# Patient Record
Sex: MALE | URBAN - METROPOLITAN AREA
[De-identification: names, ages, dates, MRNs, and addresses within clinical notes are randomized per-mention and may not be internally consistent; named-entity substitution may affect disease eponyms.]

---

## 2018-06-01 ENCOUNTER — OUTPATIENT (OUTPATIENT)
Dept: OUTPATIENT SERVICES | Facility: HOSPITAL | Age: 63
LOS: 1 days | End: 2018-06-01
Payer: MEDICAID

## 2018-06-01 ENCOUNTER — EMERGENCY (EMERGENCY)
Facility: HOSPITAL | Age: 63
LOS: 1 days | Discharge: HOME | End: 2018-06-01
Attending: EMERGENCY MEDICINE | Admitting: EMERGENCY MEDICINE
Payer: COMMERCIAL

## 2018-06-01 VITALS
DIASTOLIC BLOOD PRESSURE: 87 MMHG | TEMPERATURE: 98 F | HEART RATE: 93 BPM | OXYGEN SATURATION: 89 % | RESPIRATION RATE: 17 BRPM | SYSTOLIC BLOOD PRESSURE: 143 MMHG

## 2018-06-01 VITALS
TEMPERATURE: 98 F | DIASTOLIC BLOOD PRESSURE: 71 MMHG | OXYGEN SATURATION: 92 % | SYSTOLIC BLOOD PRESSURE: 136 MMHG | RESPIRATION RATE: 18 BRPM | HEART RATE: 79 BPM

## 2018-06-01 DIAGNOSIS — R05 COUGH: ICD-10-CM

## 2018-06-01 DIAGNOSIS — Z88.5 ALLERGY STATUS TO NARCOTIC AGENT: ICD-10-CM

## 2018-06-01 DIAGNOSIS — J44.9 CHRONIC OBSTRUCTIVE PULMONARY DISEASE, UNSPECIFIED: ICD-10-CM

## 2018-06-01 DIAGNOSIS — F17.200 NICOTINE DEPENDENCE, UNSPECIFIED, UNCOMPLICATED: ICD-10-CM

## 2018-06-01 LAB
ALBUMIN SERPL ELPH-MCNC: 3.4 G/DL — SIGNIFICANT CHANGE UP (ref 3.3–5)
ALP SERPL-CCNC: 144 U/L — HIGH (ref 40–120)
ALT FLD-CCNC: 65 U/L — HIGH (ref 10–45)
ANION GAP SERPL CALC-SCNC: 12 MMOL/L — SIGNIFICANT CHANGE UP (ref 5–17)
APTT BLD: 27.1 SEC — LOW (ref 27.5–37.4)
AST SERPL-CCNC: 54 U/L — HIGH (ref 10–40)
BASE EXCESS BLDV CALC-SCNC: 3.9 MMOL/L — SIGNIFICANT CHANGE UP
BASOPHILS NFR BLD AUTO: 0.4 % — SIGNIFICANT CHANGE UP (ref 0–2)
BILIRUB SERPL-MCNC: 0.4 MG/DL — SIGNIFICANT CHANGE UP (ref 0.2–1.2)
BUN SERPL-MCNC: 8 MG/DL — SIGNIFICANT CHANGE UP (ref 7–23)
CA-I SERPL-SCNC: 1.15 MMOL/L — SIGNIFICANT CHANGE UP (ref 1.12–1.3)
CALCIUM SERPL-MCNC: 9 MG/DL — SIGNIFICANT CHANGE UP (ref 8.4–10.5)
CHLORIDE SERPL-SCNC: 94 MMOL/L — LOW (ref 96–108)
CO2 SERPL-SCNC: 29 MMOL/L — SIGNIFICANT CHANGE UP (ref 22–31)
CREAT SERPL-MCNC: 0.74 MG/DL — SIGNIFICANT CHANGE UP (ref 0.5–1.3)
EOSINOPHIL NFR BLD AUTO: 1.7 % — SIGNIFICANT CHANGE UP (ref 0–6)
EXTRA SST TUBE: SIGNIFICANT CHANGE UP
GAS PNL BLDV: 137 MMOL/L — LOW (ref 138–146)
GAS PNL BLDV: SIGNIFICANT CHANGE UP
GAS PNL BLDV: SIGNIFICANT CHANGE UP
GLUCOSE SERPL-MCNC: 98 MG/DL — SIGNIFICANT CHANGE UP (ref 70–99)
HCO3 BLDV-SCNC: 30 MMOL/L — HIGH (ref 20–27)
HCT VFR BLD CALC: 35.6 % — LOW (ref 39–50)
HGB BLD-MCNC: 11.8 G/DL — LOW (ref 13–17)
INR BLD: 1.2 — HIGH (ref 0.88–1.16)
LACTATE SERPL-SCNC: 0.5 MMOL/L — SIGNIFICANT CHANGE UP (ref 0.5–2)
LYMPHOCYTES # BLD AUTO: 31.4 % — SIGNIFICANT CHANGE UP (ref 13–44)
MCHC RBC-ENTMCNC: 30.3 PG — SIGNIFICANT CHANGE UP (ref 27–34)
MCHC RBC-ENTMCNC: 33.1 G/DL — SIGNIFICANT CHANGE UP (ref 32–36)
MCV RBC AUTO: 91.5 FL — SIGNIFICANT CHANGE UP (ref 80–100)
MONOCYTES NFR BLD AUTO: 8.8 % — SIGNIFICANT CHANGE UP (ref 2–14)
NEUTROPHILS NFR BLD AUTO: 57.7 % — SIGNIFICANT CHANGE UP (ref 43–77)
PCO2 BLDV: 49 MMHG — SIGNIFICANT CHANGE UP (ref 41–51)
PH BLDV: 7.4 — SIGNIFICANT CHANGE UP (ref 7.32–7.43)
PLATELET # BLD AUTO: 269 K/UL — SIGNIFICANT CHANGE UP (ref 150–400)
PO2 BLDV: 38 MMHG — SIGNIFICANT CHANGE UP
POTASSIUM BLDV-SCNC: 4.1 MMOL/L — SIGNIFICANT CHANGE UP (ref 3.5–4.9)
POTASSIUM SERPL-MCNC: 4.2 MMOL/L — SIGNIFICANT CHANGE UP (ref 3.5–5.3)
POTASSIUM SERPL-SCNC: 4.2 MMOL/L — SIGNIFICANT CHANGE UP (ref 3.5–5.3)
PROT SERPL-MCNC: 7.8 G/DL — SIGNIFICANT CHANGE UP (ref 6–8.3)
PROTHROM AB SERPL-ACNC: 13.4 SEC — HIGH (ref 9.8–12.7)
RBC # BLD: 3.89 M/UL — LOW (ref 4.2–5.8)
RBC # FLD: 13.5 % — SIGNIFICANT CHANGE UP (ref 10.3–16.9)
SAO2 % BLDV: 76 % — SIGNIFICANT CHANGE UP
SODIUM SERPL-SCNC: 135 MMOL/L — SIGNIFICANT CHANGE UP (ref 135–145)
WBC # BLD: 9.3 K/UL — SIGNIFICANT CHANGE UP (ref 3.8–10.5)
WBC # FLD AUTO: 9.3 K/UL — SIGNIFICANT CHANGE UP (ref 3.8–10.5)

## 2018-06-01 PROCEDURE — 71046 X-RAY EXAM CHEST 2 VIEWS: CPT | Mod: 26

## 2018-06-01 PROCEDURE — 71046 X-RAY EXAM CHEST 2 VIEWS: CPT

## 2018-06-01 PROCEDURE — 93010 ELECTROCARDIOGRAM REPORT: CPT

## 2018-06-01 PROCEDURE — 87040 BLOOD CULTURE FOR BACTERIA: CPT

## 2018-06-01 PROCEDURE — 82330 ASSAY OF CALCIUM: CPT

## 2018-06-01 PROCEDURE — 99285 EMERGENCY DEPT VISIT HI MDM: CPT | Mod: 25

## 2018-06-01 PROCEDURE — 83605 ASSAY OF LACTIC ACID: CPT

## 2018-06-01 PROCEDURE — 84132 ASSAY OF SERUM POTASSIUM: CPT

## 2018-06-01 PROCEDURE — 99284 EMERGENCY DEPT VISIT MOD MDM: CPT | Mod: 25

## 2018-06-01 PROCEDURE — 82803 BLOOD GASES ANY COMBINATION: CPT

## 2018-06-01 PROCEDURE — 93005 ELECTROCARDIOGRAM TRACING: CPT

## 2018-06-01 PROCEDURE — 94640 AIRWAY INHALATION TREATMENT: CPT

## 2018-06-01 PROCEDURE — 85025 COMPLETE CBC W/AUTO DIFF WBC: CPT

## 2018-06-01 PROCEDURE — 85730 THROMBOPLASTIN TIME PARTIAL: CPT

## 2018-06-01 PROCEDURE — 80053 COMPREHEN METABOLIC PANEL: CPT

## 2018-06-01 PROCEDURE — 84295 ASSAY OF SERUM SODIUM: CPT

## 2018-06-01 PROCEDURE — 85610 PROTHROMBIN TIME: CPT

## 2018-06-01 PROCEDURE — G9001: CPT

## 2018-06-01 RX ORDER — ALBUTEROL 90 UG/1
2 AEROSOL, METERED ORAL
Qty: 1 | Refills: 0 | OUTPATIENT
Start: 2018-06-01 | End: 2018-06-30

## 2018-06-01 RX ORDER — IPRATROPIUM/ALBUTEROL SULFATE 18-103MCG
3 AEROSOL WITH ADAPTER (GRAM) INHALATION ONCE
Qty: 0 | Refills: 0 | Status: COMPLETED | OUTPATIENT
Start: 2018-06-01 | End: 2018-06-01

## 2018-06-01 RX ADMIN — Medication 3 MILLILITER(S): at 16:56

## 2018-06-01 RX ADMIN — Medication 3 MILLILITER(S): at 16:40

## 2018-06-01 RX ADMIN — Medication 50 MILLIGRAM(S): at 16:40

## 2018-06-01 NOTE — ED PROVIDER NOTE - MEDICAL DECISION MAKING DETAILS
Pt with known hx of COPD with heavy smoking presents with shortness of breath over one month.  seen by doctor today and had routine blood testing done but was told to come to ED if still symptomatic, - pt somnolent secondary to methadone use.  lungs clear - O2 stat initially 80s improved with nebs, steroids, and with pt becoming more awake, labs ok, cxr with ? infiltrate, will treat with steroids, nebs and antibiotics at discharge secondary to smoking use.  Pt received pulm follow up.

## 2018-06-01 NOTE — ED PROVIDER NOTE - ATTESTATION, MLM
I have reviewed and confirmed nurses' notes for patient's medications, allergies, medical history, and surgical history.
Verbalized Understanding/Patient asked questions/Simple: Patient demonstrates quick and easy understanding

## 2018-06-01 NOTE — ED PROVIDER NOTE - OBJECTIVE STATEMENT
64 y/o m with PMH of COPD not on home O2, BPH, on methadone s/p IVDA for 30 years presents to ED with dypsnea on exertion, cough x 1 month.  Pt seen by doctor from program today and referred to ED secondary to low O2 levels.  Pt denies fever.  States cough has improved.  No pleuritic chest pain.  No leg pain or leg swelling.  PT smokes daily for many years.

## 2018-06-01 NOTE — ED ADULT NURSE NOTE - OBJECTIVE STATEMENT
Patient presents to ED with c/o cough and SOB x3 weeks. Patient reports "It feels like somebody punched me in my ribs." Patient speaking in full sentences. Hx of COPD. Patient reports "I quit smoking 1 week ago." Reports fevers at home. Placed on cardiac monitor and 2L O2 via nasal cannula. Safety precautions maintained. Will continue to monitor.

## 2018-06-06 DIAGNOSIS — R69 ILLNESS, UNSPECIFIED: ICD-10-CM

## 2018-06-06 LAB
CULTURE RESULTS: SIGNIFICANT CHANGE UP
CULTURE RESULTS: SIGNIFICANT CHANGE UP
SPECIMEN SOURCE: SIGNIFICANT CHANGE UP
SPECIMEN SOURCE: SIGNIFICANT CHANGE UP

## 2018-07-01 ENCOUNTER — OUTPATIENT (OUTPATIENT)
Dept: OUTPATIENT SERVICES | Facility: HOSPITAL | Age: 63
LOS: 1 days | End: 2018-07-01

## 2018-07-22 DIAGNOSIS — Z71.89 OTHER SPECIFIED COUNSELING: ICD-10-CM

## 2021-07-21 NOTE — ED ADULT TRIAGE NOTE - NS ED NURSE AMBULANCES
carl    Patient is calling on the status of [facility] paper work that was dropped off at clinic last week states his employer has not yet received    [facility]

## 2024-09-17 ENCOUNTER — APPOINTMENT (OUTPATIENT)
Age: 69
End: 2024-09-17
Payer: MEDICARE

## 2024-09-17 ENCOUNTER — APPOINTMENT (OUTPATIENT)
Dept: UROLOGY | Facility: CLINIC | Age: 69
End: 2024-09-17

## 2024-09-17 VITALS
SYSTOLIC BLOOD PRESSURE: 117 MMHG | WEIGHT: 152 LBS | TEMPERATURE: 96.1 F | HEIGHT: 65.55 IN | RESPIRATION RATE: 14 BRPM | HEART RATE: 84 BPM | OXYGEN SATURATION: 91 % | BODY MASS INDEX: 25.02 KG/M2 | DIASTOLIC BLOOD PRESSURE: 78 MMHG

## 2024-09-17 DIAGNOSIS — Z12.5 ENCOUNTER FOR SCREENING FOR MALIGNANT NEOPLASM OF PROSTATE: ICD-10-CM

## 2024-09-17 PROCEDURE — 99203 OFFICE O/P NEW LOW 30 MIN: CPT

## 2024-09-19 LAB
APPEARANCE: CLEAR
BACTERIA: NEGATIVE /HPF
BILIRUBIN URINE: NEGATIVE
BLOOD URINE: NEGATIVE
CAST: 0 /LPF
COLOR: YELLOW
EPITHELIAL CELLS: 0 /HPF
GLUCOSE QUALITATIVE U: NEGATIVE MG/DL
KETONES URINE: NEGATIVE MG/DL
LEUKOCYTE ESTERASE URINE: NEGATIVE
MICROSCOPIC-UA: NORMAL
NITRITE URINE: NEGATIVE
PH URINE: 6
PROTEIN URINE: NEGATIVE MG/DL
PSA FREE FLD-MCNC: 53 %
PSA FREE SERPL-MCNC: 0.54 NG/ML
PSA SERPL-MCNC: 1.01 NG/ML
RED BLOOD CELLS URINE: 0 /HPF
SPECIFIC GRAVITY URINE: 1.01
UROBILINOGEN URINE: 0.2 MG/DL
WHITE BLOOD CELLS URINE: 0 /HPF

## 2024-09-20 LAB — BACTERIA UR CULT: NORMAL

## 2024-09-22 NOTE — HISTORY OF PRESENT ILLNESS
[FreeTextEntry1] :   CHEMO OCHOA Jan 20 1955   Language: English Date of First visit: 09/17/2024 Accompanied by: self  Contact info: Referring Provider/PCP: Dr. Dr. Juan C Moon  [phone] Fax:    CC/ Problem List:  LUTS =============================================================================== FIRST VISIT / Summary: Very pleasant 69 year old M here for frequent urination x 2 months. Unable to leave apartment for more than 1 hour due to constant need to void. Up every 75 minutes at night to void. Has pain to right lower back and right lower abdominal pain. Enlarged prostate for 12-15 years. Verbalizes 2 months ago sat on wooded chair, it broke and he fell on his buttocks.  Reports constipation. Urinary urgency, delayed start, weak stream, intermittency, nocturia and leakage at end of void  ------------------------------------------------------------------------------------------- INTERVAL VISITS:   ===============================================================================   PMH: COPD, enlarged prostate Meds: tamsulosin, annoro, methadone All: denies FHx: paternal uncles with prostate cancer diagnosed in their 60-70s  SocHx: current smoker 1 ppd x 55 yrs, no Etoh, single, no children, retired    PSH: splenectomy   ROS: Review of Systems is as per HPI unless otherwise denoted below   =============================================================================== DATA: LABS (SELECTED):---------------------------------------------------------------------------------------------------     RADS:-------------------------------------------------------------------------------------------------------------------     PATHOLOGY/CYTOLOGY:-------------------------------------------------------------------------------------------     VOIDING STUDIES: ----------------------------------------------------------------------------------------------------     STONE STUDIES: (Analysis/LLSA)----------------------------------------------------------------------------------     PROCEDURES: -----------------------------------------------------------------------------------------------       =============================================================================== PHYSICAL EXAM:    FOCUSED: ----------------------------------------------------------------------------------------------------------------     ======================================================================================= DISCUSSION: ======================================================================================= ASSESSMENT and PLAN   1. LUTS -UA/UCx -PSA -discussed medication/surgery options -offered refills for medications -RTC in 6-12 months   =======================================================================================  3 Thank you for allowing me to assist in the care of your patient. Should you have any questions please do not hesitate to reach out to me.     uJan C Coy MD                                                     [facility]   [place] Office: [address], [address] [place] [address] T: [phone] F: [phone]   [place] Office: [address], [address] [place] [address] T: [phone] F: [phone]

## 2024-09-22 NOTE — HISTORY OF PRESENT ILLNESS
[FreeTextEntry1] :   CHEMO OCHOA Jan 20 1955   Language: English Date of First visit: 09/17/2024 Accompanied by: self  Contact info: Referring Provider/PCP: Dr. Dr. Juan C Moon  [phone] Fax:    CC/ Problem List:  LUTS =============================================================================== FIRST VISIT / Summary: Very pleasant 69 year old M here for frequent urination x 2 months. Unable to leave apartment for more than 1 hour due to constant need to void. Up every 75 minutes at night to void. Has pain to right lower back and right lower abdominal pain. Enlarged prostate for 12-15 years. Verbalizes 2 months ago sat on wooded chair, it broke and he fell on his buttocks.  Reports constipation. Urinary urgency, delayed start, weak stream, intermittency, nocturia and leakage at end of void  ------------------------------------------------------------------------------------------- INTERVAL VISITS:   ===============================================================================   PMH: COPD, enlarged prostate Meds: tamsulosin, annoro, methadone All: denies FHx: paternal uncles with prostate cancer diagnosed in their 60-70s  SocHx: current smoker 1 ppd x 55 yrs, no Etoh, single, no children, retired    PSH: splenectomy   ROS: Review of Systems is as per HPI unless otherwise denoted below   =============================================================================== DATA: LABS (SELECTED):---------------------------------------------------------------------------------------------------     RADS:-------------------------------------------------------------------------------------------------------------------     PATHOLOGY/CYTOLOGY:-------------------------------------------------------------------------------------------     VOIDING STUDIES: ----------------------------------------------------------------------------------------------------     STONE STUDIES: (Analysis/LLSA)----------------------------------------------------------------------------------     PROCEDURES: -----------------------------------------------------------------------------------------------       =============================================================================== PHYSICAL EXAM:    FOCUSED: ----------------------------------------------------------------------------------------------------------------     ======================================================================================= DISCUSSION: ======================================================================================= ASSESSMENT and PLAN   1. LUTS -UA/UCx -PSA -discussed medication/surgery options -offered refills for medications -RTC in 6-12 months   =======================================================================================  3 Thank you for allowing me to assist in the care of your patient. Should you have any questions please do not hesitate to reach out to me.     Juan C Coy MD                                                     [facility]   [place] Office: [address], [address] [place] [address] T: [phone] F: [phone]   [place] Office: [address], [address] [place] [address] T: [phone] F: [phone]

## 2024-09-24 ENCOUNTER — APPOINTMENT (OUTPATIENT)
Age: 69
End: 2024-09-24
Payer: MEDICARE

## 2024-09-24 VITALS
TEMPERATURE: 97 F | SYSTOLIC BLOOD PRESSURE: 129 MMHG | BODY MASS INDEX: 25.02 KG/M2 | HEART RATE: 91 BPM | HEIGHT: 65.55 IN | DIASTOLIC BLOOD PRESSURE: 80 MMHG | RESPIRATION RATE: 14 BRPM | OXYGEN SATURATION: 92 % | WEIGHT: 152 LBS

## 2024-09-24 PROCEDURE — 99214 OFFICE O/P EST MOD 30 MIN: CPT

## 2024-09-24 PROCEDURE — G2211 COMPLEX E/M VISIT ADD ON: CPT

## 2024-09-24 NOTE — HISTORY OF PRESENT ILLNESS
[FreeTextEntry1] : CHEMO OCHOA Jan 20 1955  Language: English Date of First visit: 09/17/2024 Accompanied by: self Contact info: Referring Provider/PCP: Glenn undecided Fax: tw  CC/ Problem List: LUTS =============================================================================== FIRST VISIT / Summary: Very pleasant 69 year old M here for frequent urination x 2 months. Unable to leave apartment for more than 1 hour due to constant need to void. Up every 75 minutes at night to void. Has pain to right lower back and right lower abdominal pain. Enlarged prostate for 12-15 years. Verbalizes 2 months ago sat on wooded chair, it broke and he fell on his buttocks. Reports constipation. Urinary urgency, delayed start, weak stream, intermittency, nocturia and leakage at end of void ------------------------------------------------------------------------------------------- INTERVAL VISITS: The patient's medications and allergies were reviewed and edited below. Dated 09/24/2024   Here for back pain "daggers" in lower back. Had new mattress but not improved. He thinks related to his fall on chair. Has not seen spine specialist. He is looking for a new PCP. He states he is limiting fluids before bedtime. tries for 20 minutes to urinate.   ===============================================================================  PMH: COPD, enlarged prostate Meds: tamsulosin, annoro, methadone All: denies FHx: paternal uncles with prostate cancer diagnosed in their 60-70s SocHx: current smoker 1 ppd x 55 yrs, no Etoh, single, no children, retired  PSH: splenectomy  ROS: Review of Systems is as per HPI unless otherwise denoted below  =============================================================================== DATA: LABS (SELECTED):---------------------------------------------------------------------------------------------------   RADS:-------------------------------------------------------------------------------------------------------------------   PATHOLOGY/CYTOLOGY:-------------------------------------------------------------------------------------------   VOIDING STUDIES: ---------------------------------------------------------------------------------------------------- 09/24/2024 Unable to do uroflow: Random scan 435cc.  STONE STUDIES: (Analysis/LLSA)----------------------------------------------------------------------------------   PROCEDURES: -----------------------------------------------------------------------------------------------    =============================================================================== PHYSICAL EXAM:   FOCUSED: ----------------------------------------------------------------------------------------------------------------   ======================================================================================= DISCUSSION: ======================================================================================= ASSESSMENT and PLAN  1. LUTS - UA/UCx - PSA - discussed medication/surgery options - offered refills for medications  2. Back pain - RBUS, see spine specialist given recent injury - RTC in 1 week after US  ======================================================================================= The total time personally spent preparing for this visit (reviewing test results, obtaining external history) and during the visit (ordering tests/medications, spent face to face with the patient / family and counseling them on the above), as well as after the visit (on clinical documentation and coordination with other care providers) was approximately 35 minutes.   Thank you for allowing me to assist in the care of your patient. Should you have any questions please do not hesitate to reach out to me.   Juan C Coy MD       [facility] Physician [facility]  [place] Office: [address], [address] [place] [address] T: [phone] F: [phone]  [place] Office: [address], [address] [place] [address] T: [phone] F: [phone]

## 2024-09-25 ENCOUNTER — APPOINTMENT (OUTPATIENT)
Dept: ORTHOPEDIC SURGERY | Facility: CLINIC | Age: 69
End: 2024-09-25
Payer: MEDICARE

## 2024-09-25 VITALS — BODY MASS INDEX: 24.59 KG/M2 | WEIGHT: 153 LBS | HEIGHT: 66 IN

## 2024-09-25 DIAGNOSIS — M54.9 DORSALGIA, UNSPECIFIED: ICD-10-CM

## 2024-09-25 DIAGNOSIS — M54.16 RADICULOPATHY, LUMBAR REGION: ICD-10-CM

## 2024-09-25 PROCEDURE — 72110 X-RAY EXAM L-2 SPINE 4/>VWS: CPT

## 2024-09-25 PROCEDURE — 99204 OFFICE O/P NEW MOD 45 MIN: CPT | Mod: 25

## 2024-09-25 RX ORDER — CYCLOBENZAPRINE HYDROCHLORIDE 10 MG/1
10 TABLET, FILM COATED ORAL 3 TIMES DAILY
Qty: 90 | Refills: 1 | Status: ACTIVE | COMMUNITY
Start: 2024-09-25 | End: 1900-01-01

## 2024-09-25 RX ORDER — CYCLOBENZAPRINE HYDROCHLORIDE 10 MG/1
10 TABLET, FILM COATED ORAL 3 TIMES DAILY
Qty: 90 | Refills: 1 | Status: DISCONTINUED | COMMUNITY
Start: 2024-09-25 | End: 2024-09-25

## 2024-09-25 NOTE — ASSESSMENT
[FreeTextEntry1] : 69 year old male presents with low back pain. He had a mechanical fall a few weeks ago which caused the pain.  He has pain and numbness radiating to his hips.  He denies recent illness, fevers, weakness, balance problems, saddle anesthesia, or fecal incontinence. He is on methadone.  He also reports worsened urinary symptoms including frequency and feelings of retention.  He saw his urologist who US. He will be sent for a lumbar MRI to r/o compression of the cauda equina. Given cyclobenzaprine prn.  The patient was given a referral for physical therapy.  F/U after MRI. We discussed red flag symptoms that would require emergent evaluation. He knows to call with any questions or concerns or if his symptoms acutely worsen.

## 2024-09-25 NOTE — HISTORY OF PRESENT ILLNESS
[de-identified] : 69 year old male presents with low back pain. He had a mechanical fall a few weeks ago which caused the pain.  He has pain and numbness radiating to his hips.  He denies recent illness, fevers, weakness, balance problems, saddle anesthesia, or fecal incontinence. He is on methadone.  He also reports worsened urinary symptoms including frequency and feelings of retention.  He saw his urologist who US.

## 2024-09-25 NOTE — PHYSICAL EXAM
[de-identified] : General: No acute distress, conversant, well-nourished. Head: Normocephalic, atraumatic Neck: trachea midline, FROM Heart: normotensive and normal rate and rhythm Lungs: No labored breathing Skin: No abrasions, no rashes, no edema Psych: Alert and oriented to person, place and time Extremities: no peripheral edema or digital cyanosis Gait: Normal gait. Can perform tandem gait.   Vascular: warm and well perfused distally, palpable distal pulses MSK: Lumbar spine: No tenderness to palpation.  No step-off, no deformity.  NEURO EXAM: Sensation  Left L2  -  2/2             Left L3  -  2/2 Left L4  -  2/2 Left L5  -  2/2 Left S1  -  2/2  Right L2  -  2/2             Right L3  -  2/2 Right L4  -  2/2 Right L5  -  2/2 Right S1  -  2/2  Motor:  Left L2 (hip flexion)                            5/5                 Left L3 (knee extension)                   5/5                 Left L4 (ankle dorsiflexion)                 5/5                 Left L5 (long toe extensor)                5/5                 Left S1 (ankle plantar flexion)           5/5  Right L2 (hip flexion)                            5/5                 Right L3 (knee extension)                   5/5                 Right L4 (ankle dorsiflexion)                 5/5                 Right L5 (long toe extensor)                5/5                 Right S1 (ankle plantar flexion)           5/5  Reflexes: Normal and symmetric Negative clonus.  Down-going Babinski.    [de-identified] : I ordered radiographs to evaluate the patient's symptoms. Lumbar 4 view radiographs taken in the office today show no dislocation or fracture.  Lumbar spondylosis.  No instability on dynamic series.

## 2024-09-27 ENCOUNTER — NON-APPOINTMENT (OUTPATIENT)
Age: 69
End: 2024-09-27

## 2024-10-01 ENCOUNTER — APPOINTMENT (OUTPATIENT)
Age: 69
End: 2024-10-01
Payer: MEDICARE

## 2024-10-01 VITALS
WEIGHT: 153 LBS | OXYGEN SATURATION: 91 % | TEMPERATURE: 96.9 F | SYSTOLIC BLOOD PRESSURE: 130 MMHG | HEIGHT: 66 IN | HEART RATE: 107 BPM | DIASTOLIC BLOOD PRESSURE: 81 MMHG | BODY MASS INDEX: 24.59 KG/M2 | RESPIRATION RATE: 14 BRPM

## 2024-10-01 DIAGNOSIS — R39.9 UNSPECIFIED SYMPTOMS AND SIGNS INVOLVING THE GENITOURINARY SYSTEM: ICD-10-CM

## 2024-10-01 PROCEDURE — 99213 OFFICE O/P EST LOW 20 MIN: CPT

## 2024-10-01 PROCEDURE — G2211 COMPLEX E/M VISIT ADD ON: CPT

## 2024-10-04 NOTE — HISTORY OF PRESENT ILLNESS
[FreeTextEntry1] : CHEMO OCHOA Jan 20 1955  Language: English Date of First visit: 09/17/2024 Accompanied by: self Contact info: Referring Provider/PCP: Glenn undecided Fax: tw  CC/ Problem List: LUTS =============================================================================== FIRST VISIT / Summary: Very pleasant 69 year old M here for frequent urination x 2 months. Unable to leave apartment for more than 1 hour due to constant need to void. Up every 75 minutes at night to void. Has pain to right lower back and right lower abdominal pain. Enlarged prostate for 12-15 years. Verbalizes 2 months ago sat on wooded chair, it broke and he fell on his buttocks. Reports constipation. Urinary urgency, delayed start, weak stream, intermittency, nocturia and leakage at end of void ------------------------------------------------------------------------------------------- INTERVAL VISITS: The patient's medications and allergies were reviewed and edited below. Dated 10/01/2024   Here for LUTS follow-up. US renal bladder did not show any hydronephrosis. Saw ortho spine re back pain. Ongoing management  ===============================================================================  PMH: COPD, enlarged prostate Meds: tamsulosin, annoro, methadone All: denies FHx: paternal uncles with prostate cancer diagnosed in their 60-70s SocHx: current smoker 1 ppd x 55 yrs, no Etoh, single, no children, retired  PSH: splenectomy  ROS: Review of Systems is as per HPI unless otherwise denoted below  =============================================================================== DATA: LABS (SELECTED):--------------------------------------------------------------------------------------------------- 9/18/24: PSA 1.01  RADS:------------------------------------------------------------------------------------------------------------------- 9/27/2024: US renal bladder with no hydronephrosis or stones. Prostate small around 20cc  PATHOLOGY/CYTOLOGY:-------------------------------------------------------------------------------------------   VOIDING STUDIES: ---------------------------------------------------------------------------------------------------- 09/24/2024 Unable to do uroflow: Random scan 435cc.  STONE STUDIES: (Analysis/LLSA)----------------------------------------------------------------------------------   PROCEDURES: -----------------------------------------------------------------------------------------------    =============================================================================== PHYSICAL EXAM:   FOCUSED: ----------------------------------------------------------------------------------------------------------------   ======================================================================================= DISCUSSION: ======================================================================================= ASSESSMENT and PLAN  1. LUTS - UA/UCx - PSA - discussed medication/surgery options - offered refills for medications - RTC in 3-6 months  2. Back pain - f/u with spine specialist- MRI pending  ======================================================================================= The total time personally spent preparing for this visit (reviewing test results, obtaining external history) and during the visit (ordering tests/medications, spent face to face with the patient / family and counseling them on the above), as well as after the visit (on clinical documentation and coordination with other care providers) was approximately 25 minutes.  Thank you for allowing me to assist in the care of your patient. Should you have any questions please do not hesitate to reach out to me.   Juan C Coy MD [facility] for [facility]  [place] Office: [address], [address] [place] [address] T: [phone] F: [phone]  [place] Office: [address], [address] [place] [address] T: [phone] F: [phone]

## 2024-10-04 NOTE — HISTORY OF PRESENT ILLNESS
[FreeTextEntry1] : CHEMO OCHOA Jan 20 1955  Language: English Date of First visit: 09/17/2024 Accompanied by: self Contact info: Referring Provider/PCP: Glenn undecided Fax: tw  CC/ Problem List: LUTS =============================================================================== FIRST VISIT / Summary: Very pleasant 69 year old M here for frequent urination x 2 months. Unable to leave apartment for more than 1 hour due to constant need to void. Up every 75 minutes at night to void. Has pain to right lower back and right lower abdominal pain. Enlarged prostate for 12-15 years. Verbalizes 2 months ago sat on wooded chair, it broke and he fell on his buttocks. Reports constipation. Urinary urgency, delayed start, weak stream, intermittency, nocturia and leakage at end of void ------------------------------------------------------------------------------------------- INTERVAL VISITS: The patient's medications and allergies were reviewed and edited below. Dated 10/01/2024   Here for LUTS follow-up. US renal bladder did not show any hydronephrosis. Saw ortho spine re back pain. Ongoing management  ===============================================================================  PMH: COPD, enlarged prostate Meds: tamsulosin, annoro, methadone All: denies FHx: paternal uncles with prostate cancer diagnosed in their 60-70s SocHx: current smoker 1 ppd x 55 yrs, no Etoh, single, no children, retired  PSH: splenectomy  ROS: Review of Systems is as per HPI unless otherwise denoted below  =============================================================================== DATA: LABS (SELECTED):--------------------------------------------------------------------------------------------------- 9/18/24: PSA 1.01  RADS:------------------------------------------------------------------------------------------------------------------- 9/27/2024: US renal bladder with no hydronephrosis or stones. Prostate small around 20cc  PATHOLOGY/CYTOLOGY:-------------------------------------------------------------------------------------------   VOIDING STUDIES: ---------------------------------------------------------------------------------------------------- 09/24/2024 Unable to do uroflow: Random scan 435cc.  STONE STUDIES: (Analysis/LLSA)----------------------------------------------------------------------------------   PROCEDURES: -----------------------------------------------------------------------------------------------    =============================================================================== PHYSICAL EXAM:   FOCUSED: ----------------------------------------------------------------------------------------------------------------   ======================================================================================= DISCUSSION: ======================================================================================= ASSESSMENT and PLAN  1. LUTS - UA/UCx - PSA - discussed medication/surgery options - offered refills for medications - RTC in 3-6 months  2. Back pain - f/u with spine specialist- MRI pending  ======================================================================================= The total time personally spent preparing for this visit (reviewing test results, obtaining external history) and during the visit (ordering tests/medications, spent face to face with the patient / family and counseling them on the above), as well as after the visit (on clinical documentation and coordination with other care providers) was approximately 25 minutes.  Thank you for allowing me to assist in the care of your patient. Should you have any questions please do not hesitate to reach out to me.   Juan C Coy MD [facility] Physician [facility] for Urology  [place] Office: [address], [address] [place] [address] T: [phone] F: [phone]  [place] Office: [address], 1[address] [place] [address] T: [phone] F: [phone]